# Patient Record
Sex: MALE | Race: WHITE
[De-identification: names, ages, dates, MRNs, and addresses within clinical notes are randomized per-mention and may not be internally consistent; named-entity substitution may affect disease eponyms.]

---

## 2019-06-11 ENCOUNTER — HOSPITAL ENCOUNTER (INPATIENT)
Dept: HOSPITAL 41 - JD.ED | Age: 64
LOS: 1 days | Discharge: NURSING, REHAB OR LONG-TERM CARE | DRG: 101 | End: 2019-06-12
Attending: FAMILY MEDICINE | Admitting: FAMILY MEDICINE
Payer: MEDICARE

## 2019-06-11 DIAGNOSIS — Z79.899: ICD-10-CM

## 2019-06-11 DIAGNOSIS — F29: ICD-10-CM

## 2019-06-11 DIAGNOSIS — K59.09: ICD-10-CM

## 2019-06-11 DIAGNOSIS — F71: ICD-10-CM

## 2019-06-11 DIAGNOSIS — F41.9: ICD-10-CM

## 2019-06-11 DIAGNOSIS — G40.209: ICD-10-CM

## 2019-06-11 DIAGNOSIS — H54.7: ICD-10-CM

## 2019-06-11 DIAGNOSIS — G40.901: Primary | ICD-10-CM

## 2019-06-11 DIAGNOSIS — R32: ICD-10-CM

## 2019-06-11 PROCEDURE — 85007 BL SMEAR W/DIFF WBC COUNT: CPT

## 2019-06-11 PROCEDURE — 70450 CT HEAD/BRAIN W/O DYE: CPT

## 2019-06-11 PROCEDURE — G0480 DRUG TEST DEF 1-7 CLASSES: HCPCS

## 2019-06-11 PROCEDURE — 36415 COLL VENOUS BLD VENIPUNCTURE: CPT

## 2019-06-11 PROCEDURE — 80053 COMPREHEN METABOLIC PANEL: CPT

## 2019-06-11 PROCEDURE — 85027 COMPLETE CBC AUTOMATED: CPT

## 2019-06-11 PROCEDURE — 99285 EMERGENCY DEPT VISIT HI MDM: CPT

## 2019-06-11 PROCEDURE — 96374 THER/PROPH/DIAG INJ IV PUSH: CPT

## 2019-06-11 PROCEDURE — 84100 ASSAY OF PHOSPHORUS: CPT

## 2019-06-11 PROCEDURE — 96375 TX/PRO/DX INJ NEW DRUG ADDON: CPT

## 2019-06-11 PROCEDURE — 83735 ASSAY OF MAGNESIUM: CPT

## 2019-06-11 NOTE — PCM.HP
H&P History of Present Illness





- General


Date of Service: 06/11/19


Admit Problem/Dx: 


 Admission Diagnosis/Problem





Admission Diagnosis/Problem      Seizure











- History of Present Illness


Initial Comments - Free Text/Narative: 





This 64-year-old disabled, mentally handicapped patient with known seizure 

disorder was brought to the emergency room from [facility] after 

developing a seizure around 1645. Patient is known to have an increase in 

seizure activity when he gets excited, but after several years of coming to 

[place] has never had a seizure there. Patient presented in the emergency 

room having a generalized tonic-clonic seizure. Patient is on Keppra, Lamictal, 

and Klonopin. He has not missed any doses. In the emergency room he continued 

to have seizures after 3 mg of Ativan, so he was given diazepam 15 mg. This did 

break his seizure and at that time patient was transferred to the floor. When 

patient arrives on the floor he continued to have fine tonic-clonic movement 

with head deviation to the left and left-sided eye deviation with horizontal 

nystagmus. Patient was given Ativan 2 mg IV which did break his current 

seizure. Unfortunately, patient has been in and out of seizures subsequently. 

Call was made to [facility] and Dr. Gurmeet Mesa recommended loading him with 

Keppra 500 mg IV. After the loading dose of Keppra patient did have 1 more four-

minute seizure, but currently is seizure free. patient was transferred from the 

[facility] floor to the ICU for closer monitoring.





- Related Data


Allergies/Adverse Reactions: 


 Allergies











Allergy/AdvReac Type Severity Reaction Status Date / Time


 


No Known Allergies Allergy   Verified 06/11/19 21:06











Home Medications: 


 Home Meds





Calcium Carbonate [Calcium] 500 mg PO TID 06/11/19 [History]


ClonazePAM [KlonoPIN] 0.25 mg PO BEDTIME 06/11/19 [History]


ClonazePAM [KlonoPIN] 0.5 mg PO DAILY 06/11/19 [History]


Docusate Sodium [Colace] 100 mg PO BID 06/11/19 [History]


Escitalopram [Lexapro] 10 mg PO BEDTIME 06/11/19 [History]


Methylcellulose (with Sugar) [Citrucel] 1 tbsp PO DAILY 06/11/19 [History]


Mirtazapine [Remeron] 30 mg PO BEDTIME 06/11/19 [History]


Oxybutynin [Oxybutynin  ER] 5 mg PO BEDTIME 06/11/19 [History]


Ziprasidone HCl [Geodon] 60 mg PO BID 06/11/19 [History]


atorvaSTATin Calcium [Lipitor] 20 mg PO BEDTIME 06/11/19 [History]


lamoTRIgine [Lamictal] 300 mg PO DAILY 06/11/19 [History]


lamoTRIgine [Lamictal] 400 mg PO BEDTIME 06/11/19 [History]


levETIRAcetam [Keppra] 1,500 mg PO BID 06/11/19 [History]











Past Medical History


HEENT History: Reports: Impaired Vision


Gastrointestinal History: Reports: Chronic Constipation


Neurological History: Reports: Seizure, Other (See Below)


Psychiatric History: Reports: Anxiety, Psychosis


Other Psychiatric History: intellictual disorder- moderate





Social & Family History





- Family History


Family Medical History: Unobtainable





- Tobacco Use


Smoking Status *Q: Current Status Unknown


Second Hand Smoke Exposure: No





- Caffeine Use


Caffeine Use: Reports: None





- Recreational Drug Use


Recreational Drug Use: No





- Living Situation & Occupation


Living situation: Reports: Single, Other (Group home for the disabled in [place], [place])


Occupation: Disabled





H&P Review of Systems





- Review of Systems:


Review Of Systems: Unable To Obtain





Exam





- Exam


Exam: See Below





- Vital Signs


Vital Signs: 


 Last Vital Signs











Temp  96.7 F   06/11/19 18:02


 


Pulse  71   06/11/19 18:02


 


Resp  24 H  06/11/19 18:02


 


BP  139/76   06/11/19 18:02


 


Pulse Ox  94 L  06/11/19 18:02











Weight: 190 lb 14.4 oz





- Exam


General: Sedated, Other (between seizures does use one-word answers.)


HEENT: Conjunctiva Clear, Mucosa Moist & Pink, Posterior Pharynx Clear


Neck: Supple, Trachea Midline


Lungs: Clear to Auscultation, Normal Respiratory Effort


Cardiovascular: Regular Rate, Regular Rhythm


GI/Abdominal Exam: Normal Bowel Sounds, Soft, Non-Tender, No Distention


Extremities: Normal Inspection, No Pedal Edema


Neuro Extensive - Motor, Sensory, Reflexes: Other (fine, tonic-clonic seizure 

activity generalize. Eye and head deviation to the left with horizontal 

nystagmus. between seizures does follow directions with squeezing hands right 

greater than left.)





- Patient Data


Lab Results Last 24 hrs: 


 Laboratory Results - last 24 hr











  06/11/19 06/11/19 Range/Units





  18:24 18:24 


 


WBC  6.73   (4.23-9.07)  K/mm3


 


RBC  4.61 L   (4.63-6.08)  M/mm3


 


Hgb  14.1   (13.7-17.5)  gm/L


 


Hct  41.5   (40.1-51.0)  %


 


MCV  90.0   (79.0-92.2)  fl


 


MCH  30.6   (25.7-32.2)  pg


 


MCHC  34.0   (32.2-35.5)  g/dl


 


RDW Std Deviation  41.0   (35.1-43.9)  fL


 


Plt Count  267   (163-337)  K/mm3


 


MPV  9.9   (9.4-12.3)  fl


 


Neutrophils % (Manual)  67 H   (40-60)  %


 


Band Neutrophils %  0   (0-10)  %


 


Lymphocytes % (Manual)  29   (20-40)  %


 


Atypical Lymphs %  0   %


 


Monocytes % (Manual)  1 L   (2-10)  %


 


Eosinophils % (Manual)  2   (0.8-7.0)  %


 


Basophils % (Manual)  1   (0.2-1.2)  


 


Platelet Estimate  Adequate   


 


Plt Morphology Comment  Normal   


 


RBC Morph Comment  Normal   


 


Sodium   138  (136-145)  mEq/L


 


Potassium   3.8  (3.5-5.1)  mEq/L


 


Chloride   102  ()  mEq/L


 


Carbon Dioxide   25  (21-32)  mEq/L


 


Anion Gap   14.8  (5-15)  


 


BUN   15  (7-18)  mg/dL


 


Creatinine   1.2  (0.7-1.3)  mg/dL


 


Est Cr Clr Drug Dosing   58.58  mL/min


 


Estimated GFR (MDRD)   > 60  (>60)  mL/min


 


BUN/Creatinine Ratio   12.5 L  (14-18)  


 


Glucose   107  ()  mg/dL


 


Calcium   10.0  (8.5-10.1)  mg/dL


 


Phosphorus   4.1  (2.6-4.7)  mg/dL


 


Magnesium   2.0  (1.8-2.4)  mg/dl


 


Total Bilirubin   0.5  (0.2-1.0)  mg/dL


 


AST   18  (15-37)  U/L


 


ALT   25  (16-63)  U/L


 


Alkaline Phosphatase   94  ()  U/L


 


Total Protein   8.0  (6.4-8.2)  g/dl


 


Albumin   4.2  (3.4-5.0)  g/dl


 


Globulin   3.8  gm/dL


 


Albumin/Globulin Ratio   1.1  (1-2)  


 


Ethyl Alcohol   0.00  (0.00)  gm%











Result Diagrams: 


 06/11/19 18:24





 06/11/19 18:24


Imaging Impressions Last 24 hrs: 





CT of the brain showed asymmetric cerebellar atrophy but no acute findings and 

no bleeds.





- Problem List


(1) Status epilepticus


SNOMED Code(s): 105952327


   ICD Code: G40.901 - EPILEPSY, UNSP, NOT INTRACTABLE, WITH STATUS EPILEPTICUS

   Status: Acute   Current Visit: Yes   





(2) Mentally disabled


SNOMED Code(s): 130774913


   ICD Code: F79 - UNSPECIFIED INTELLECTUAL DISABILITIES   Status: Acute   

Current Visit: Yes   


Problem List Initiated/Reviewed/Updated: Yes


Orders Last 24hrs: 


 Active Orders 24 hr











 Category Date Time Status


 


 Patient Status [ADT] Routine ADT  06/11/19 22:25 Ordered


 


 Antiembolic Devices [RC] PER UNIT ROUTINE Care  06/11/19 22:29 Ordered


 


 Cardiac Monitoring [RC] CONTINUOUS Care  06/11/19 22:28 Ordered


 


 Oxygen Therapy [RC] PRN Care  06/11/19 22:27 Ordered


 


 Pulse Oximetry [RC] CONTINUOUS Care  06/11/19 22:28 Ordered


 


 Up With Assistance [RC] ASDIRECTED Care  06/11/19 22:27 Ordered


 


 VTE/DVT Education [RC] PER UNIT ROUTINE Care  06/11/19 22:27 Ordered


 


 Vital Signs [RC] Q4H Care  06/11/19 22:27 Ordered


 


 Nothing per Oral Now Diet [DIET] Diet  06/11/19 Breakfast Ordered


 


 CBC WITH AUTO DIFF [HEME] AM Lab  06/12/19 05:11 Ordered


 


 COMPREHENSIVE METABOLIC PN,CMP [CHEM] AM Lab  06/12/19 05:11 Ordered


 


 MAGNESIUM [CHEM] AM Lab  06/12/19 05:11 Ordered


 


 ClonazePAM [KlonoPIN] Med  06/12/19 21:00 Ordered





 0.25 mg PO BEDTIME   


 


 ClonazePAM [KlonoPIN] Med  06/12/19 09:00 Ordered





 0.5 mg PO DAILY   


 


 Docusate Sodium [Colace] Med  06/12/19 09:00 Ordered





 100 mg PO BID   


 


 Escitalopram Med  06/12/19 21:00 Ordered





 10 mg PO BEDTIME   


 


 Sodium Chloride 0.9% [Normal Saline] 1,000 ml Med  06/11/19 22:30 Ordered





 IV ASDIRECTED   


 


 Ziprasidone HCl Med  06/12/19 09:00 Ordered





 60 mg PO BID   


 


 atorvaSTATin Calcium Med  06/12/19 21:00 Ordered





 20 mg PO BEDTIME   


 


 lamoTRIgine [Lamictal] Med  06/12/19 09:00 Ordered





 300 mg PO DAILY   


 


 lamoTRIgine [Lamictal] Med  06/12/19 21:00 Ordered





 400 mg PO BEDTIME   


 


 levETIRAcetam [Keppra] Med  06/11/19 21:45 Ordered





 1,500 mg PO BID   


 


 Antiembolic Hose [OM.PC] Per Unit Routine Oth  06/11/19 22:28 Ordered


 


 Resuscitation Status Routine Resus Stat  06/11/19 21:35 Ordered








 Medication Orders





Citalopram Hydrobromide (Celexa)  20 mg PO BEDTIME CLEOPATRA


Clonazepam (Klonopin)  0.25 mg PO BEDTIME CLEOPATRA


Clonazepam (Klonopin)  0.5 mg PO DAILY CLEOPATRA


Docusate Sodium (Colace)  100 mg PO BID CLEOPATRA


Sodium Chloride (Normal Saline)  1,000 mls @ 100 mls/hr IV ASDIRECTED CLEOPATRA


Lamotrigine (Lamotrigine)  300 mg PO DAILY CLEOPATRA


Lamotrigine (Lamotrigine)  400 mg PO BEDTIME CLEOPATRA


Levetiracetam (Keppra)  1,500 mg PO BID CLEOPATRA


Simvastatin (Zocor)  20 mg PO BEDTIME CLEOPATRA


Ziprasidone (Geodon)  60 mg PO BID CLEOPATRA








Assessment/Plan Comment:: 





status epilepticus in a patient with known seizure disorder


* CMP, CBC, alcohol level are all normal


* CT of the brain shows no acute findings


* Patient has had a total of 5 mg of Ativan IV and 15 mg of diazepam


* Keppra 500 mg IV loading dose


patient continued to seize after the above treatment. Patient was given another 

Ativan 2 mg IV and then Dilantin 1700 mg IV bolus. Patient has been seizure 

free since the Dilantin bolus. Monitor overnight and recheck labs in the 

morning.  Restart home meds when awake and alert.

## 2019-06-11 NOTE — CT
Head CT

 

Technique: Multiple axial sections through the brain were obtained.  

Intravenous contrast was not utilized.

 

Comparison: No prior intracranial imaging is present.

 

Findings: Asymmetric atrophy is seen of the cerebellum as compared to 

other portions of the brain.  No abnormal parenchymal densities are 

seen.  No evidence of intracranial hemorrhage.  No midline shift or 

mass effect is seen.

 

Mild mucosal thickening is seen within the ethmoid and frontal 

sinuses.  No air-fluid levels are appreciated within the paranasal 

sinuses.  Mastoid sinuses are clear.

 

No acute calvarial abnormality is appreciated.

 

Impression:

1.  Asymmetric cerebellar atrophy.  This can be seen with certain 

types of medication.

2.  Sinus findings which most likely are due to chronic sinusitis.  No

 fluid is seen within the sinuses to indicate acute sinusitis.

3.  No acute intracranial abnormality is appreciated on noncontrast 

head CT exam.

 

Diagnostic code #2

## 2019-06-11 NOTE — EDM.PDOC
ED HPI GENERAL MEDICAL PROBLEM





- General


Stated Complaint: SEIZURES


Time Seen by Provider: 06/11/19 17:51


Source of Information: Reports: Other (2 caregivers from)


History Limitations: Reports: Physical Impairment (currently seizing)





- History of Present Illness


INITIAL COMMENTS - FREE TEXT/NARRATIVE: 





According to 2 [facility] counselors from [facility] a camp for disabled 

persons, the patient started having seizures around 16:45. He has had variable 

degrees of wakefulness in between the seizures, however, at present, the 

patient appears to be having a generalized tonic-clonic seizure. He has not 

bitten his tongue or lip, and he has not lost continence of bowel or bladder. 

The patient has a history of complex partial seizures, and is on both Keppra 

and Lamictal, along with Klonopin, given for anxiety. According to the 

counselors, the patient has not missed any doses of his medications, nor has he 

been sleep deprived. No recent illnesses, to their knowledge, however, the 

patient just came to [facility] yesterday.





I spoke with the patient's , Liz Angulo, on the phone at 18:02, 

who informed me that the patient ordinarily has about 10 relatively brief 

seizures every 6 months. Most seizures last about 2 minutes, however, every year

, when the patient goes to [facility], he gets very excited, and when he is excited, 

his seizures last longer. She stated that the patient has been extremely 

excited about this current camp trip, for quite some time, therefore it does 

not surprise her that he is having such prolonged seizures. She does not recall 

when the last imaging study of his head was performed. She does not recall if 

one particular antiepileptic treatment - Ativan versus diazepam, etc - is 

preferable.





Ms. Angulo and I reviewed the patient's past medical history. She believes that 

he has not undergone any surgeries.





The patient's PCP is Dr. Jimenez.


The patient's Neurologist is in [place], but Ms. Angulo did not recall his name.


The patient's Psychiatrist is Dr. Trevizo.











- Related Data


 Allergies











Allergy/AdvReac Type Severity Reaction Status Date / Time


 


No Known Allergies Allergy   Verified 06/11/19 18:08














Past Medical History


Neurological History: Reports: Seizure (complex partial), Other (See Below) (

Moderate intellectual disability)


Psychiatric History: Reports: Anxiety, Psychosis





Social & Family History





- Tobacco Use


Smoking Status *Q: Never Smoker


Second Hand Smoke Exposure: No





- Alcohol Use


Alcohol Use History: No





- Recreational Drug Use


Recreational Drug Use: No





- Living Situation & Occupation


Living situation: Reports: Single, Other (Group home for the disabled in [place], [place])


Occupation: Disabled





ED ROS GENERAL





- Review of Systems


Review Of Systems: ROS reveals no pertinent complaints other than HPI.





- Physical Exam


Exam: See Below


Exam Limited By: Physical Impairment (Seizures)


General Appearance: WD/WN


Ears: Normal External Exam


Nose: Normal Inspection


Throat/Mouth: Normal Inspection, Normal Lips, No Airway Compromise


Head Exam: Atraumatic, Normocephalic


Neck: Normal Inspection


Respiratory/Chest: No Respiratory Distress, Lungs Clear, Normal Breath Sounds, 

No Accessory Muscle Use


Cardiovascular: Normal Peripheral Pulses, Regular Rate, Rhythm, No Edema, No 

Gallop, No JVD, No Murmur, No Rub


GI/Abdominal: Normal Bowel Sounds, Soft, No Organomegaly, No Distention, No 

Abnormal Bruit, No Mass


 (Male) Exam: Deferred


Rectal (Males) Exam: Deferred


Neuro Exam (Abbreviated): Other (Actively seizing)


Back Exam: Normal Inspection, Full Range of Motion, NT


Extremities: Normal Inspection, Normal Capillary Refill


Skin Exam: Warm, Dry, Intact, Normal Color, No Rash





Course





- Vital Signs


Last Recorded V/S: 


 Last Vital Signs











Temp  35.9 C   06/11/19 18:02


 


Pulse  71   06/11/19 18:02


 


Resp  24 H  06/11/19 18:02


 


BP  139/76   06/11/19 18:02


 


Pulse Ox  94 L  06/11/19 18:02














- Orders/Labs/Meds


Labs: 


 Laboratory Tests











  06/11/19 06/11/19 Range/Units





  18:24 18:24 


 


WBC  6.73   (4.23-9.07)  K/mm3


 


RBC  4.61 L   (4.63-6.08)  M/mm3


 


Hgb  14.1   (13.7-17.5)  gm/L


 


Hct  41.5   (40.1-51.0)  %


 


MCV  90.0   (79.0-92.2)  fl


 


MCH  30.6   (25.7-32.2)  pg


 


MCHC  34.0   (32.2-35.5)  g/dl


 


RDW Std Deviation  41.0   (35.1-43.9)  fL


 


Plt Count  267   (163-337)  K/mm3


 


MPV  9.9   (9.4-12.3)  fl


 


Neutrophils % (Manual)  67 H   (40-60)  %


 


Band Neutrophils %  0   (0-10)  %


 


Lymphocytes % (Manual)  29   (20-40)  %


 


Atypical Lymphs %  0   %


 


Monocytes % (Manual)  1 L   (2-10)  %


 


Eosinophils % (Manual)  2   (0.8-7.0)  %


 


Basophils % (Manual)  1   (0.2-1.2)  


 


Platelet Estimate  Adequate   


 


Plt Morphology Comment  Normal   


 


RBC Morph Comment  Normal   


 


Sodium   138  (136-145)  mEq/L


 


Potassium   3.8  (3.5-5.1)  mEq/L


 


Chloride   102  ()  mEq/L


 


Carbon Dioxide   25  (21-32)  mEq/L


 


Anion Gap   14.8  (5-15)  


 


BUN   15  (7-18)  mg/dL


 


Creatinine   1.2  (0.7-1.3)  mg/dL


 


Est Cr Clr Drug Dosing   58.58  mL/min


 


Estimated GFR (MDRD)   > 60  (>60)  mL/min


 


BUN/Creatinine Ratio   12.5 L  (14-18)  


 


Glucose   107  ()  mg/dL


 


Calcium   10.0  (8.5-10.1)  mg/dL


 


Phosphorus   4.1  (2.6-4.7)  mg/dL


 


Magnesium   2.0  (1.8-2.4)  mg/dl


 


Total Bilirubin   0.5  (0.2-1.0)  mg/dL


 


AST   18  (15-37)  U/L


 


ALT   25  (16-63)  U/L


 


Alkaline Phosphatase   94  ()  U/L


 


Total Protein   8.0  (6.4-8.2)  g/dl


 


Albumin   4.2  (3.4-5.0)  g/dl


 


Globulin   3.8  gm/dL


 


Albumin/Globulin Ratio   1.1  (1-2)  


 


Ethyl Alcohol   0.00  (0.00)  gm%











Meds: 


Medications














Discontinued Medications














Generic Name Dose Route Start Last Admin





  Trade Name Freq  PRN Reason Stop Dose Admin


 


Diazepam  15 mg  06/11/19 18:24  06/11/19 18:29





  Valium  IV  06/11/19 18:25  15 mg





  ONETIME STA   Administration





     





     





     





     


 


Diazepam  Confirm  06/11/19 18:26  06/11/19 19:09





  Valium  Administered  06/11/19 18:27  Not Given





  Dose   





  10 mg   





  .ROUTE   





  .STK-MED ONE   





     





     





     





     


 


Diazepam  Confirm  06/11/19 18:26  06/11/19 19:09





  Valium  Administered  06/11/19 18:27  Not Given





  Dose   





  10 mg   





  .ROUTE   





  .STK-MED ONE   





     





     





     





     


 


Lorazepam  Confirm  06/11/19 17:52  06/11/19 18:16





  Ativan  Administered  06/11/19 17:53  Not Given





  Dose   





  2 mg   





  .ROUTE   





  .STK-MED ONE   





     





     





     





     


 


Lorazepam  Confirm  06/11/19 18:09  06/11/19 18:15





  Ativan  Administered  06/11/19 18:10  Not Given





  Dose   





  2 mg   





  .ROUTE   





  .STK-MED ONE   





     





     





     





     


 


Lorazepam  1 mg  06/11/19 18:11  06/11/19 18:13





  Ativan  IVPUSH  06/11/19 18:12  1 mg





  ONETIME STA   Administration





     





     





     





     


 


Lorazepam  1 mg  06/11/19 17:56  06/11/19 17:56





  Ativan  IVPUSH  06/11/19 17:57  1 mg





  ONETIME ONE   Administration





     





     





     





     


 


Lorazepam  1 mg  06/11/19 18:04  06/11/19 18:04





  Ativan  IVPUSH  06/11/19 18:05  1 mg





  ONETIME ONE   Administration





     





     





     





     














- Re-Assessments/Exams


Free Text/Narrative Re-Assessment/Exam: 





06/11/19 17:59


The patient may be in status epilepticus, because while it is known that he has 

a seizure disorder, the frequency of his seizures is not known. An IV is being 

established, and the patient will receive IV lorazepam. I have ordered blood 

work, a urine drug screen, and, since the patient has never been here before, a 

CT scan of his head without contrast. Unfortunately, both Keppra and Lamictal 

levels are send-out tests.








06/11/19 18:24


So far, the patient has received a total of 3 mg of IV lorazepam. Initially, 

when I first saw him, he was having more of a generalized tonic-clonic seizure. 

The generalized tonic-clonic activity has ceased, however, the patient is still 

unresponsive, with both his eyes deviated to the left, with frequent eyebrow 

raising. His body is relatively flaccid. I ordered diazepam 15 mg IVP.





I cannot leave the patient in status epilepticus. If I can't get control of his 

seizures with benzodiazepines, I may need to consider intubation and propofol.








06/11/19 18:34


Following IV diazepam, the patient now appears to be sleeping. His eyes are no 

longer deviated to the left. Cheli BRO informed me that he was earlier 

incontinent of urine. His oxygen saturation briefly dropped - Cheli BRO and I 

repositioned him, so that he is sitting more upright, and his oxygen saturation 

is now in the low 90s on room air. I am reluctant to give him supplemental 

oxygen, for this will mask hypoventilation. If the patient develops respiratory 

depression to the point that he cannot adequately oxygenate, that would mean 

that he is also not adequately ventilating, and intubation would need to be 

considered.








06/11/19 19:14


CT of the head without contrast is read by Dr. Sorenson as:


1. Asymmetric cerebellar atrophy. This can be seen with certain types of 

medication.


2. Sinus findings which most likely are due to chronic sinusitis. No fluid is 

seen within the sinuses to indicate acute sinusitis.


3. No acute intracranial abnormality is appreciated on noncontrast head CT exam.








06/11/19 19:36


The patient has been sleeping, but is able to be woken. I don't feel, however, 

that the patient is fit for return to [facility]. I would prefer that he stay the 

night. Unfortunately, the camp counselors inform me that even if medically 

cleared, they do not want him back at [facility].





Case discussed with Dr. Gillis at 19:30. He accepted the patient for placement 

into observation on the medical floor.





Departure





- Departure


Time of Disposition: 19:38


Disposition: Refer to Observation


Condition: Good


Clinical Impression: 


 Epileptic seizures








- Discharge Information


*PRESCRIPTION DRUG MONITORING PROGRAM REVIEWED*: Not Applicable


*COPY OF PRESCRIPTION DRUG MONITORING REPORT IN PATIENT HYUN: Not Applicable


Referrals: 


PCP,None [Primary Care Provider] -

## 2019-06-12 NOTE — PCM.DCSUM1
**Discharge Summary





- Hospital Course


HPI Initial Comments: 





This 64-year-old disabled, mentally handicapped patient with known seizure 

disorder was brought to the emergency room from [employer] after 

developing a seizure around 1645. Patient is known to have an increase in 

seizure activity when he gets excited, but after [place] of coming to 

[place] has never had a seizure there. Patient presented in the emergency 

room having a generalized tonic-clonic seizure. Patient is on Keppra, Lamictal, 

and Klonopin. He has not missed any doses. In the emergency room he continued 

to have seizures after 3 mg of Ativan, so he was given diazepam 15 mg. This did 

break his seizure and at that time patient was transferred to the floor. When 

patient arrives on the floor he continued to have fine tonic-clonic movement 

with head deviation to the left and left-sided eye deviation with horizontal 

nystagmus. Patient was given Ativan 2 mg IV which did break his current 

seizure. Unfortunately, patient has been in and out of seizures subsequently. 

Call was made to [facility] and Dr. Gurmeet Mesa recommended loading him with 

Keppra 500 mg IV. After the loading dose of Keppra patient did have 1 more four-

minute seizure, but currently is seizure free. patient was transferred from the 

[facility] floor to the ICU for closer monitoring.





Brief History: Over today patient had several episodes of left eye deviation 

and head deviation. Patient had an EEG that did not show any epileptiform 

activity. This evening little after 1900 patient did develop tonic-clonic 

activity and I was called by nursing. Patient was given 2 mg of Ativan IV and 

this arrested the seizure. Nursing reports that the total time of seizure was 

77 minutes.


Diagnosis: Stroke: No





- Discharge Data


Discharge Date: 06/12/19


Discharge Disposition: DC/Tfer to Acute Hospital 02


Condition: Poor





- Discharge Diagnosis/Problem(s)


(1) Status epilepticus


SNOMED Code(s): 996808597


   ICD Code: G40.901 - EPILEPSY, UNSP, NOT INTRACTABLE, WITH STATUS EPILEPTICUS

   Status: Acute   Current Visit: Yes   





(2) Mentally disabled


SNOMED Code(s): 509749563


   ICD Code: F79 - UNSPECIFIED INTELLECTUAL DISABILITIES   Status: Acute   

Current Visit: Yes   





- Discharge Plan


*PRESCRIPTION DRUG MONITORING PROGRAM REVIEWED*: Not Applicable


*COPY OF PRESCRIPTION DRUG MONITORING REPORT IN PATIENT HYUN: Not Applicable


Home Medications: 


 Home Meds





ClonazePAM [KlonoPIN] 0.25 mg PO BEDTIME 06/11/19 [History]


ClonazePAM [KlonoPIN] 0.5 mg PO DAILY 06/11/19 [History]


Docusate Sodium [Colace] 100 mg PO BID 06/11/19 [History]


Escitalopram [Lexapro] 10 mg PO BEDTIME 06/11/19 [History]


Ziprasidone HCl [Geodon] 60 mg PO BID 06/11/19 [History]


atorvaSTATin Calcium [Lipitor] 20 mg PO BEDTIME 06/11/19 [History]


lamoTRIgine [Lamictal] 300 mg PO DAILY 06/11/19 [History]


lamoTRIgine [Lamictal] 400 mg PO BEDTIME 06/11/19 [History]


levETIRAcetam [Keppra] 1,500 mg PO BID 06/11/19 [History]








Oxygen Therapy Mode: Nasal Cannula


Oxygen Flow Rate (L/min): 2


Forms:  ED Department Discharge


Referrals: 


PCP,None [Primary Care Provider] - 





- Discharge Summary/Plan Comment


DC Time >30 min.: Yes


Discharge Summary/Plan Comment: 





Patient will be transferred to [facility] in [place] for 

specialized care. Dr. Mesa, his neurologist has been informed and Dr. Hall, 

intensivist, had accepted the patient for transfer to the ICU. Patient will be 

sent by [facility].





- Patient Data


Vitals - Most Recent: 


 Last Vital Signs











Temp  97.6 F   06/12/19 20:00


 


Pulse  74   06/12/19 16:00


 


Resp  25 H  06/12/19 20:00


 


BP  156/90 H  06/12/19 20:00


 


Pulse Ox  96   06/12/19 20:00











Weight - Most Recent: 193 lb


I&O - Last 24 hours: 


 Intake & Output











 06/12/19 06/12/19 06/12/19





 06:59 14:59 22:59


 


Intake Total 


 


Output Total 600  


 


Balance -15 [phone]











Lab Results - Last 24 hrs: 


 Laboratory Results - last 24 hr











  06/11/19 06/12/19 06/12/19 Range/Units





  23:00 05:25 05:25 


 


WBC   8.34   (4.23-9.07)  K/mm3


 


RBC   4.45 L   (4.63-6.08)  M/mm3


 


Hgb   13.5 L   (13.7-17.5)  gm/L


 


Hct   39.8 L   (40.1-51.0)  %


 


MCV   89.4   (79.0-92.2)  fl


 


MCH   30.3   (25.7-32.2)  pg


 


MCHC   33.9   (32.2-35.5)  g/dl


 


RDW Std Deviation   40.0   (35.1-43.9)  fL


 


Plt Count   226   (163-337)  K/mm3


 


MPV   9.8   (9.4-12.3)  fl


 


Neut % (Auto)   63.0   (34.0-67.9)  %


 


Lymph % (Auto)   21.8   (21.8-53.1)  %


 


Mono % (Auto)   10.9   (5.3-12.2)  %


 


Eos % (Auto)   3.6   (0.8-7.0)  


 


Baso % (Auto)   0.6   (0.1-1.2)  %


 


Neut # (Auto)   5.25   (1.78-5.38)  K/mm3


 


Lymph # (Auto)   1.82   (1.32-3.57)  K/mm3


 


Mono # (Auto)   0.91 H   (0.30-0.82)  K/mm3


 


Eos # (Auto)   0.30   (0.04-0.54)  K/mm3


 


Baso # (Auto)   0.05   (0.01-0.08)  K/mm3


 


Sodium    138  (136-145)  mEq/L


 


Potassium    3.9  (3.5-5.1)  mEq/L


 


Chloride    103  ()  mEq/L


 


Carbon Dioxide    24  (21-32)  mEq/L


 


Anion Gap    14.9  (5-15)  


 


BUN    13  (7-18)  mg/dL


 


Creatinine    1.0  (0.7-1.3)  mg/dL


 


Est Cr Clr Drug Dosing    72.66  mL/min


 


Estimated GFR (MDRD)    > 60  (>60)  mL/min


 


BUN/Creatinine Ratio    13.0 L  (14-18)  


 


Glucose    93  ()  mg/dL


 


Lactic Acid     (0.4-2.0)  mmol/L


 


Calcium    9.0  (8.5-10.1)  mg/dL


 


Magnesium    1.8  (1.8-2.4)  mg/dl


 


Total Bilirubin    0.8  (0.2-1.0)  mg/dL


 


AST    19  (15-37)  U/L


 


ALT    23  (16-63)  U/L


 


Alkaline Phosphatase    85  ()  U/L


 


Total Protein    7.3  (6.4-8.2)  g/dl


 


Albumin    3.7  (3.4-5.0)  g/dl


 


Globulin    3.6  gm/dL


 


Albumin/Globulin Ratio    1.0  (1-2)  


 


MRSA (PCR)  Negative    














  06/12/19 Range/Units





  05:25 


 


WBC   (4.23-9.07)  K/mm3


 


RBC   (4.63-6.08)  M/mm3


 


Hgb   (13.7-17.5)  gm/L


 


Hct   (40.1-51.0)  %


 


MCV   (79.0-92.2)  fl


 


MCH   (25.7-32.2)  pg


 


MCHC   (32.2-35.5)  g/dl


 


RDW Std Deviation   (35.1-43.9)  fL


 


Plt Count   (163-337)  K/mm3


 


MPV   (9.4-12.3)  fl


 


Neut % (Auto)   (34.0-67.9)  %


 


Lymph % (Auto)   (21.8-53.1)  %


 


Mono % (Auto)   (5.3-12.2)  %


 


Eos % (Auto)   (0.8-7.0)  


 


Baso % (Auto)   (0.1-1.2)  %


 


Neut # (Auto)   (1.78-5.38)  K/mm3


 


Lymph # (Auto)   (1.32-3.57)  K/mm3


 


Mono # (Auto)   (0.30-0.82)  K/mm3


 


Eos # (Auto)   (0.04-0.54)  K/mm3


 


Baso # (Auto)   (0.01-0.08)  K/mm3


 


Sodium   (136-145)  mEq/L


 


Potassium   (3.5-5.1)  mEq/L


 


Chloride   ()  mEq/L


 


Carbon Dioxide   (21-32)  mEq/L


 


Anion Gap   (5-15)  


 


BUN   (7-18)  mg/dL


 


Creatinine   (0.7-1.3)  mg/dL


 


Est Cr Clr Drug Dosing   mL/min


 


Estimated GFR (MDRD)   (>60)  mL/min


 


BUN/Creatinine Ratio   (14-18)  


 


Glucose   ()  mg/dL


 


Lactic Acid  1.1  (0.4-2.0)  mmol/L


 


Calcium   (8.5-10.1)  mg/dL


 


Magnesium   (1.8-2.4)  mg/dl


 


Total Bilirubin   (0.2-1.0)  mg/dL


 


AST   (15-37)  U/L


 


ALT   (16-63)  U/L


 


Alkaline Phosphatase   ()  U/L


 


Total Protein   (6.4-8.2)  g/dl


 


Albumin   (3.4-5.0)  g/dl


 


Globulin   gm/dL


 


Albumin/Globulin Ratio   (1-2)  


 


MRSA (PCR)   











Med Orders - Current: 


 Current Medications





Citalopram Hydrobromide (Celexa)  20 mg PO BEDTIME [facility]


   Last Admin: 06/11/19 23:34 Dose:  Not Given


Clonazepam (Klonopin)  0.25 mg PO BEDTIME [facility]


Clonazepam (Klonopin)  0.5 mg PO DAILY [facility]


   Last Admin: 06/12/19 08:00 Dose:  0.5 mg


Docusate Sodium (Colace)  100 mg PO BID [facility]


   Last Admin: 06/12/19 07:59 Dose:  100 mg


Sodium Chloride (Normal Saline)  1,000 mls @ 100 mls/hr IV ASDIRECTED [facility]


   Last Admin: 06/12/19 19:57 Dose:  100 mls/hr


Lamotrigine (Lamotrigine)  300 mg PO DAILY [facility]


   Last Admin: 06/12/19 08:00 Dose:  300 mg


Lamotrigine (Lamotrigine)  400 mg PO BEDTIME [facility]


   Last Admin: 06/11/19 23:35 Dose:  Not Given


Levetiracetam (Keppra)  1,500 mg PO BID [facility]


   Last Admin: 06/12/19 08:00 Dose:  1,500 mg


Lorazepam (Ativan)  1 mg PO Q2H PRN


   PRN Reason: Anxiety


Simvastatin (Zocor)  20 mg PO BEDTIME [facility]


Ziprasidone (Geodon)  60 mg PO BID [facility]


   Last Admin: 06/12/19 07:59 Dose:  60 mg





Discontinued Medications





Diazepam (Valium)  15 mg IV ONETIME STA


   Stop: 06/11/19 18:25


   Last Admin: 06/11/19 18:29 Dose:  15 mg


Diazepam (Valium) Confirm Administered Dose 10 mg .ROUTE .STK-MED ONE


   Stop: 06/11/19 18:27


   Last Admin: 06/11/19 19:09 Dose:  Not Given


Diazepam (Valium) Confirm Administered Dose 10 mg .ROUTE .STK-MED ONE


   Stop: 06/11/19 18:27


   Last Admin: 06/11/19 19:09 Dose:  Not Given


Diazepam (Valium)  5 mg IV ONETIME ONE


   Stop: 06/11/19 19:55


   Last Admin: 06/11/19 20:23 Dose:  5 mg


Diazepam (Valium)  15 mg IV ONETIME STA


   Stop: 06/12/19 20:10


Levetiracetam 500 mg/ Sodium (Chloride)  105 mls @ 400 mls/hr IV ONETIME ONE


   Stop: 06/11/19 22:05


   Last Admin: 06/11/19 22:09 Dose:  400 mls/hr


Lorazepam (Ativan) Confirm Administered Dose 2 mg .ROUTE .STK-MED ONE


   Stop: 06/11/19 17:53


   Last Admin: 06/11/19 18:16 Dose:  Not Given


Lorazepam (Ativan) Confirm Administered Dose 2 mg .ROUTE .STK-MED ONE


   Stop: 06/11/19 18:10


   Last Admin: 06/11/19 18:15 Dose:  Not Given


Lorazepam (Ativan)  1 mg IVPUSH ONETIME STA


   Stop: 06/11/19 18:12


   Last Admin: 06/11/19 18:13 Dose:  1 mg


Lorazepam (Ativan)  1 mg IVPUSH ONETIME ONE


   Stop: 06/11/19 17:57


   Last Admin: 06/11/19 17:56 Dose:  1 mg


Lorazepam (Ativan)  1 mg IVPUSH ONETIME ONE


   Stop: 06/11/19 18:05


   Last Admin: 06/11/19 18:04 Dose:  1 mg


Lorazepam (Ativan)  2 mg IVPUSH ONETIME ONE


   Stop: 06/11/19 21:28


   Last Admin: 06/11/19 21:29 Dose:  2 mg


Lorazepam (Ativan) Confirm Administered Dose 2 mg .ROUTE .STK-MED ONE


   Stop: 06/11/19 21:29


   Last Admin: 06/11/19 21:35 Dose:  Not Given


Lorazepam (Ativan)  2 mg IVPUSH ONETIME ONE


   Stop: 06/11/19 23:03


   Last Admin: 06/11/19 23:07 Dose:  2 mg


Lorazepam (Ativan)  1 mg IVPUSH ONETIME STA


   Stop: 06/12/19 10:32


   Last Admin: 06/12/19 10:39 Dose:  1 mg


Lorazepam (Ativan)  1 mg IVPUSH ONETIME ONE


   Stop: 06/12/19 10:44


   Last Admin: 06/12/19 11:00 Dose:  1 mg


Lorazepam (Ativan)  2 mg IVPUSH ONETIME ONE


   Stop: 06/12/19 19:49


   Last Admin: 06/12/19 19:53 Dose:  2 mg


Lorazepam (Ativan) Confirm Administered Dose 2 mg .ROUTE .STK-MED ONE


   Stop: 06/12/19 19:52


   Last Admin: 06/12/19 19:59 Dose:  Not Given


Phenytoin Sodium (Phenytoin)  1,700 mg IVPUSH ONETIME ONE


   Stop: 06/11/19 22:58


   Last Admin: 06/11/19 23:13 Dose:  1,700 mg


Simvastatin (Zocor)  20 mg PO ONETIME ONE


   Stop: 06/11/19 21:46


   Last Admin: 06/11/19 23:35 Dose:  Not Given

## 2019-06-12 NOTE — PCM.PN
- General Info


Date of Service: 06/12/19


Admission Dx/Problem (Free Text): 


 Admission Diagnosis/Problem





Admission Diagnosis/Problem      Seizure








Subjective Update: 





Bahman continued to have seizure-like episodes off and on throughout the 

night. Symptoms seem to worsen in the early morning to every 20 minutes. I 

spoke with his cardiologist Dr. Mesa who recommended a long EEG study of at 

least 40 minutes. Dr. Mesa was able to read the EEG and stated that there was 

no seizure activity on the study. It is felt that his behaviors are not seizure 

related. Patient was given Ativan for the EEG study and currently is resting 

comfortably.





- Review of Systems


General: Reports: Other (Unable to obtain)





- Patient Data


Vitals - Most Recent: 


 Last Vital Signs











Temp  97.6 F   06/12/19 12:00


 


Pulse  72   06/12/19 12:00


 


Resp  19   06/12/19 12:00


 


BP  122/80   06/12/19 12:00


 


Pulse Ox  94 L  06/12/19 12:00











Weight - Most Recent: 193 lb


I&O - Last 24 Hours: 


 Intake & Output











 06/11/19 06/12/19 06/12/19





 22:59 06:59 14:59


 


Intake Total  585 120


 


Output Total  600 


 


Balance  -15 120











Lab Results Last 24 Hours: 


 Laboratory Results - last 24 hr











  06/11/19 06/11/19 06/11/19 Range/Units





  18:24 18:24 23:00 


 


WBC  6.73    (4.23-9.07)  K/mm3


 


RBC  4.61 L    (4.63-6.08)  M/mm3


 


Hgb  14.1    (13.7-17.5)  gm/L


 


Hct  41.5    (40.1-51.0)  %


 


MCV  90.0    (79.0-92.2)  fl


 


MCH  30.6    (25.7-32.2)  pg


 


MCHC  34.0    (32.2-35.5)  g/dl


 


RDW Std Deviation  41.0    (35.1-43.9)  fL


 


Plt Count  267    (163-337)  K/mm3


 


MPV  9.9    (9.4-12.3)  fl


 


Neut % (Auto)     (34.0-67.9)  %


 


Lymph % (Auto)     (21.8-53.1)  %


 


Mono % (Auto)     (5.3-12.2)  %


 


Eos % (Auto)     (0.8-7.0)  


 


Baso % (Auto)     (0.1-1.2)  %


 


Neut # (Auto)     (1.78-5.38)  K/mm3


 


Lymph # (Auto)     (1.32-3.57)  K/mm3


 


Mono # (Auto)     (0.30-0.82)  K/mm3


 


Eos # (Auto)     (0.04-0.54)  K/mm3


 


Baso # (Auto)     (0.01-0.08)  K/mm3


 


Neutrophils % (Manual)  67 H    (40-60)  %


 


Band Neutrophils %  0    (0-10)  %


 


Lymphocytes % (Manual)  29    (20-40)  %


 


Atypical Lymphs %  0    %


 


Monocytes % (Manual)  1 L    (2-10)  %


 


Eosinophils % (Manual)  2    (0.8-7.0)  %


 


Basophils % (Manual)  1    (0.2-1.2)  


 


Platelet Estimate  Adequate    


 


Plt Morphology Comment  Normal    


 


RBC Morph Comment  Normal    


 


Sodium   138   (136-145)  mEq/L


 


Potassium   3.8   (3.5-5.1)  mEq/L


 


Chloride   102   ()  mEq/L


 


Carbon Dioxide   25   (21-32)  mEq/L


 


Anion Gap   14.8   (5-15)  


 


BUN   15   (7-18)  mg/dL


 


Creatinine   1.2   (0.7-1.3)  mg/dL


 


Est Cr Clr Drug Dosing   58.58   mL/min


 


Estimated GFR (MDRD)   > 60   (>60)  mL/min


 


BUN/Creatinine Ratio   12.5 L   (14-18)  


 


Glucose   107   ()  mg/dL


 


Lactic Acid     (0.4-2.0)  mmol/L


 


Calcium   10.0   (8.5-10.1)  mg/dL


 


Phosphorus   4.1   (2.6-4.7)  mg/dL


 


Magnesium   2.0   (1.8-2.4)  mg/dl


 


Total Bilirubin   0.5   (0.2-1.0)  mg/dL


 


AST   18   (15-37)  U/L


 


ALT   25   (16-63)  U/L


 


Alkaline Phosphatase   94   ()  U/L


 


Total Protein   8.0   (6.4-8.2)  g/dl


 


Albumin   4.2   (3.4-5.0)  g/dl


 


Globulin   3.8   gm/dL


 


Albumin/Globulin Ratio   1.1   (1-2)  


 


Ethyl Alcohol   0.00   (0.00)  gm%


 


MRSA (PCR)    Negative  














  06/12/19 06/12/19 06/12/19 Range/Units





  05:25 05:25 05:25 


 


WBC  8.34    (4.23-9.07)  K/mm3


 


RBC  4.45 L    (4.63-6.08)  M/mm3


 


Hgb  13.5 L    (13.7-17.5)  gm/L


 


Hct  39.8 L    (40.1-51.0)  %


 


MCV  89.4    (79.0-92.2)  fl


 


MCH  30.3    (25.7-32.2)  pg


 


MCHC  33.9    (32.2-35.5)  g/dl


 


RDW Std Deviation  40.0    (35.1-43.9)  fL


 


Plt Count  226    (163-337)  K/mm3


 


MPV  9.8    (9.4-12.3)  fl


 


Neut % (Auto)  63.0    (34.0-67.9)  %


 


Lymph % (Auto)  21.8    (21.8-53.1)  %


 


Mono % (Auto)  10.9    (5.3-12.2)  %


 


Eos % (Auto)  3.6    (0.8-7.0)  


 


Baso % (Auto)  0.6    (0.1-1.2)  %


 


Neut # (Auto)  5.25    (1.78-5.38)  K/mm3


 


Lymph # (Auto)  1.82    (1.32-3.57)  K/mm3


 


Mono # (Auto)  0.91 H    (0.30-0.82)  K/mm3


 


Eos # (Auto)  0.30    (0.04-0.54)  K/mm3


 


Baso # (Auto)  0.05    (0.01-0.08)  K/mm3


 


Neutrophils % (Manual)     (40-60)  %


 


Band Neutrophils %     (0-10)  %


 


Lymphocytes % (Manual)     (20-40)  %


 


Atypical Lymphs %     %


 


Monocytes % (Manual)     (2-10)  %


 


Eosinophils % (Manual)     (0.8-7.0)  %


 


Basophils % (Manual)     (0.2-1.2)  


 


Platelet Estimate     


 


Plt Morphology Comment     


 


RBC Morph Comment     


 


Sodium   138   (136-145)  mEq/L


 


Potassium   3.9   (3.5-5.1)  mEq/L


 


Chloride   103   ()  mEq/L


 


Carbon Dioxide   24   (21-32)  mEq/L


 


Anion Gap   14.9   (5-15)  


 


BUN   13   (7-18)  mg/dL


 


Creatinine   1.0   (0.7-1.3)  mg/dL


 


Est Cr Clr Drug Dosing   72.66   mL/min


 


Estimated GFR (MDRD)   > 60   (>60)  mL/min


 


BUN/Creatinine Ratio   13.0 L   (14-18)  


 


Glucose   93   ()  mg/dL


 


Lactic Acid    1.1  (0.4-2.0)  mmol/L


 


Calcium   9.0   (8.5-10.1)  mg/dL


 


Phosphorus     (2.6-4.7)  mg/dL


 


Magnesium   1.8   (1.8-2.4)  mg/dl


 


Total Bilirubin   0.8   (0.2-1.0)  mg/dL


 


AST   19   (15-37)  U/L


 


ALT   23   (16-63)  U/L


 


Alkaline Phosphatase   85   ()  U/L


 


Total Protein   7.3   (6.4-8.2)  g/dl


 


Albumin   3.7   (3.4-5.0)  g/dl


 


Globulin   3.6   gm/dL


 


Albumin/Globulin Ratio   1.0   (1-2)  


 


Ethyl Alcohol     (0.00)  gm%


 


MRSA (PCR)     











Med Orders - Current: 


 Current Medications





Citalopram Hydrobromide (Celexa)  20 mg PO BEDTIME [facility]


   Last Admin: 06/11/19 23:34 Dose:  Not Given


Clonazepam (Klonopin)  0.25 mg PO BEDTIME [facility]


Clonazepam (Klonopin)  0.5 mg PO DAILY [facility]


   Last Admin: 06/12/19 08:00 Dose:  0.5 mg


Docusate Sodium (Colace)  100 mg PO BID [facility]


   Last Admin: 06/12/19 07:59 Dose:  100 mg


Sodium Chloride (Normal Saline)  1,000 mls @ 100 mls/hr IV ASDIRECTED [facility]


   Last Admin: 06/12/19 09:02 Dose:  100 mls/hr


Lamotrigine (Lamotrigine)  300 mg PO DAILY [facility]


   Last Admin: 06/12/19 08:00 Dose:  300 mg


Lamotrigine (Lamotrigine)  400 mg PO BEDTIME [facility]


   Last Admin: 06/11/19 23:35 Dose:  Not Given


Levetiracetam (Keppra)  1,500 mg PO BID [facility]


   Last Admin: 06/12/19 08:00 Dose:  1,500 mg


Simvastatin (Zocor)  20 mg PO BEDTIME [facility]


Ziprasidone (Geodon)  60 mg PO BID [facility]


   Last Admin: 06/12/19 07:59 Dose:  60 mg





Discontinued Medications





Diazepam (Valium)  15 mg IV ONETIME STA


   Stop: 06/11/19 18:25


   Last Admin: 06/11/19 18:29 Dose:  15 mg


Diazepam (Valium) Confirm Administered Dose 10 mg .ROUTE .STK-MED ONE


   Stop: 06/11/19 18:27


   Last Admin: 06/11/19 19:09 Dose:  Not Given


Diazepam (Valium) Confirm Administered Dose 10 mg .ROUTE .STK-MED ONE


   Stop: 06/11/19 18:27


   Last Admin: 06/11/19 19:09 Dose:  Not Given


Diazepam (Valium)  5 mg IV ONETIME ONE


   Stop: 06/11/19 19:55


   Last Admin: 06/11/19 20:23 Dose:  5 mg


Levetiracetam 500 mg/ Sodium (Chloride)  105 mls @ 400 mls/hr IV ONETIME ONE


   Stop: 06/11/19 22:05


   Last Admin: 06/11/19 22:09 Dose:  400 mls/hr


Lorazepam (Ativan) Confirm Administered Dose 2 mg .ROUTE .STK-MED ONE


   Stop: 06/11/19 17:53


   Last Admin: 06/11/19 18:16 Dose:  Not Given


Lorazepam (Ativan) Confirm Administered Dose 2 mg .ROUTE .STK-MED ONE


   Stop: 06/11/19 18:10


   Last Admin: 06/11/19 18:15 Dose:  Not Given


Lorazepam (Ativan)  1 mg IVPUSH ONETIME STA


   Stop: 06/11/19 18:12


   Last Admin: 06/11/19 18:13 Dose:  1 mg


Lorazepam (Ativan)  1 mg IVPUSH ONETIME ONE


   Stop: 06/11/19 17:57


   Last Admin: 06/11/19 17:56 Dose:  1 mg


Lorazepam (Ativan)  1 mg IVPUSH ONETIME ONE


   Stop: 06/11/19 18:05


   Last Admin: 06/11/19 18:04 Dose:  1 mg


Lorazepam (Ativan)  2 mg IVPUSH ONETIME ONE


   Stop: 06/11/19 21:28


   Last Admin: 06/11/19 21:29 Dose:  2 mg


Lorazepam (Ativan) Confirm Administered Dose 2 mg .ROUTE .STK-MED ONE


   Stop: 06/11/19 21:29


   Last Admin: 06/11/19 21:35 Dose:  Not Given


Lorazepam (Ativan)  2 mg IVPUSH ONETIME ONE


   Stop: 06/11/19 23:03


   Last Admin: 06/11/19 23:07 Dose:  2 mg


Lorazepam (Ativan)  1 mg IVPUSH ONETIME STA


   Stop: 06/12/19 10:32


   Last Admin: 06/12/19 10:39 Dose:  1 mg


Lorazepam (Ativan)  1 mg IVPUSH ONETIME ONE


   Stop: 06/12/19 10:44


   Last Admin: 06/12/19 11:00 Dose:  1 mg


Phenytoin Sodium (Phenytoin)  1,700 mg IVPUSH ONETIME ONE


   Stop: 06/11/19 22:58


   Last Admin: 06/11/19 23:13 Dose:  1,700 mg


Simvastatin (Zocor)  20 mg PO ONETIME ONE


   Stop: 06/11/19 21:46


   Last Admin: 06/11/19 23:35 Dose:  Not Given











- Exam


General: Sedated


HEENT: Pupils Equal, Pupils Reactive


Neck: Supple


Lungs: Clear to Auscultation, Normal Respiratory Effort


Cardiovascular: Regular Rate, Regular Rhythm


GI/Abdominal Exam: Normal Bowel Sounds, Soft, Non-Tender, No Distention


Extremities: Normal Inspection, Normal Range of Motion, No Pedal Edema


Skin: Warm, Dry, Intact


Neurological: Other (Earlier continued left-sided deviation of eyes and head 

with nystagmus. Also coarse jerking of the upper extremities and legs.)





- Problem List & Annotations


(1) Status epilepticus


SNOMED Code(s): 349490383


   Code(s): G40.901 - EPILEPSY, UNSP, NOT INTRACTABLE, WITH STATUS EPILEPTICUS 

  Status: Acute   Current Visit: Yes   





(2) Mentally disabled


SNOMED Code(s): 666613196


   Code(s): F79 - UNSPECIFIED INTELLECTUAL DISABILITIES   Status: Acute   

Current Visit: Yes   





- Problem List Review


Problem List Initiated/Reviewed/Updated: Yes





- My Orders


Last 24 Hours: 


My Active Orders





06/11/19 21:00


Citalopram [Celexa]   20 mg PO BEDTIME 





06/11/19 21:35


Resuscitation Status Routine 





06/11/19 21:45


Ziprasidone HCl [Geodon]   60 mg PO BID 


lamoTRIgine   400 mg PO BEDTIME 


levETIRAcetam [Keppra]   1,500 mg PO BID 





06/11/19 22:25


Patient Status [ADT] Routine 





06/11/19 22:27


VTE/DVT Education [RC] 09,21 


Vital Signs [RC] Q4HR 





06/11/19 22:28


Cardiac Monitoring [RC] CONTINUOUS 


Pulse Oximetry [RC] CONTINUOUS 


Antiembolic Hose [OM.PC] Per Unit Routine 





06/11/19 22:29


Antiembolic Devices [RC] 09,21 06/11/19 22:30


Sodium Chloride 0.9% [Normal Saline] 1,000 ml IV ASDIRECTED 





06/11/19 23:43


Supplemental O2 [Oxygen Therapy] [RC] ASDIRECTED 





06/12/19 05:24


Bladder Scan [RC] ASDIRECTED 





06/12/19 05:26


Urinary Catheter Assessment [RC] ASDIRECTED 





06/12/19 09:00


ClonazePAM [KlonoPIN]   0.5 mg PO DAILY 


Docusate Sodium [Colace]   100 mg PO BID 


lamoTRIgine   300 mg PO DAILY 





06/12/19 12:56


LORazepam [Ativan]   1 mg PO Q2H PRN 





06/12/19 21:00


ClonazePAM [KlonoPIN]   0.25 mg PO BEDTIME 


Simvastatin [Zocor]   20 mg PO BEDTIME 





06/12/19 Breakfast


Mechanical Soft Diet [DIET] 














- Assessment


Assessment:: 





Status epilepticus resolved


Seizure disorder with possible overlying pseudoseizures


* Start Ativan 1 mg every 2 hours when necessary for anxiety. Anticipate only 

using that over the next 12-16 hours.


* Patient restarted oral medications


* Plan to discharge tomorrow back to his group home.


* Discussed his care with his caregivers at the residential home.








- Plan


Plan:: 





status epilepticus in a patient with known seizure disorder


* CMP, CBC, alcohol level are all normal


* CT of the brain shows no acute findings


* Patient has had a total of 5 mg of Ativan IV and 15 mg of diazepam


* Keppra 500 mg IV loading dose


patient continued to seize after the above treatment. Patient was given another 

Ativan 2 mg IV and then Dilantin 1700 mg IV bolus. Patient has been seizure 

free since the Dilantin bolus. Monitor overnight and recheck labs in the 

morning.  Restart home meds when awake and alert.

## 2019-06-12 NOTE — CR
Chest: Portable view of the chest was obtained.

 

Comparison: No previous chest x-ray.

 

Heart size is normal.  Tortuous thoracic aorta is seen.  Lungs are 

clear with no acute parenchymal change.  Old trauma to the distal 

right clavicle is noted.

 

Pressure:

1.  Nothing acute is seen.  Incidental findings.

 

Diagnostic code #2